# Patient Record
Sex: FEMALE | Race: BLACK OR AFRICAN AMERICAN | NOT HISPANIC OR LATINO | Employment: UNEMPLOYED | URBAN - METROPOLITAN AREA
[De-identification: names, ages, dates, MRNs, and addresses within clinical notes are randomized per-mention and may not be internally consistent; named-entity substitution may affect disease eponyms.]

---

## 2017-01-16 ENCOUNTER — HOSPITAL ENCOUNTER (EMERGENCY)
Facility: HOSPITAL | Age: 47
Discharge: HOME | End: 2017-01-16
Payer: MEDICAID

## 2017-01-16 VITALS
SYSTOLIC BLOOD PRESSURE: 186 MMHG | HEART RATE: 97 BPM | BODY MASS INDEX: 38.57 KG/M2 | WEIGHT: 240 LBS | DIASTOLIC BLOOD PRESSURE: 107 MMHG | TEMPERATURE: 98 F | HEIGHT: 66 IN | RESPIRATION RATE: 18 BRPM | OXYGEN SATURATION: 97 %

## 2017-01-16 DIAGNOSIS — S83.92XA SPRAIN OF LEFT KNEE, UNSPECIFIED LIGAMENT, INITIAL ENCOUNTER: Primary | ICD-10-CM

## 2017-01-16 PROCEDURE — 99283 EMERGENCY DEPT VISIT LOW MDM: CPT

## 2017-01-16 RX ORDER — DICLOFENAC SODIUM 75 MG/1
75 TABLET, DELAYED RELEASE ORAL 2 TIMES DAILY
Qty: 20 TABLET | Refills: 0 | Status: SHIPPED | OUTPATIENT
Start: 2017-01-16 | End: 2017-03-13 | Stop reason: CLARIF

## 2017-01-16 RX ORDER — IBUPROFEN 200 MG
400 TABLET ORAL EVERY 6 HOURS PRN
Qty: 30 TABLET | Refills: 0
Start: 2017-01-16 | End: 2017-03-13 | Stop reason: CLARIF

## 2017-01-16 RX ORDER — PREDNISONE 20 MG/1
40 TABLET ORAL DAILY
Qty: 10 TABLET | Refills: 0 | Status: SHIPPED | OUTPATIENT
Start: 2017-01-16 | End: 2017-01-21

## 2017-01-16 RX ORDER — ACETAMINOPHEN 500 MG
1000 TABLET ORAL 3 TIMES DAILY PRN
Qty: 30 TABLET | Refills: 0
Start: 2017-01-16 | End: 2017-03-13 | Stop reason: CLARIF

## 2017-01-16 NOTE — ED AVS SNAPSHOT
[facility] -   [address] [address]               Yas Quinones   2017  2:22 PM   ED    Description:  Female : 1970   Department:  [facility]           Your Care was Coordinated By:     Provider Role From To    DM Gonzales Physician Assistant 17 [phone] --      Reason for Visit     Knee Pain           Diagnoses this Visit        Comments    Sprain of left knee, unspecified ligament, initial encounter    -  Primary       ED Disposition     ED Disposition Condition Comment    Discharge             To Do List           Follow-up Information     Follow up with Provider Daijaystem In 1 week(s).       These Medications        Disp Refills Start End    ibuprofen (ADVIL,MOTRIN) 200 MG tablet 30 tablet 0 2017     Take 2 tablets (400 mg total) by mouth every 6 (six) hours as needed for Pain. - Oral    Pharmacy: [facility] [address] [place] [address] AT [facility] Ph #: [phone]       acetaminophen (TYLENOL) 500 MG tablet 30 tablet 0 2017     Take 2 tablets (1,000 mg total) by mouth 3 (three) times daily as needed for Pain. - Oral    Pharmacy: [facility] [address] AT [facility] Ph #: [phone]       diclofenac (VOLTAREN) 75 MG EC tablet 20 tablet 0 2017     Take 1 tablet (75 mg total) by mouth 2 (two) times daily. - Oral    Pharmacy: [facility] [address] [facility] AT [facility] Ph #: [phone]       predniSONE (DELTASONE) 20 MG tablet 10 tablet 0 2017    Take 2 tablets (40 mg total) by mouth once daily. - Oral    Pharmacy: [facility] [address] [facility] AT [facility] Ph #: [phone]         [facility]     [facility] On [facility] Nurse Care Line -   Assistance  Registered nurses in the [facility] provide clinical advisement, health education, appointment booking, and other advisory services.  Call for this free service at [phone].             Medications           Message regarding Medications     Verify the changes and/or additions to your medication regime listed below are the same as discussed with your clinician today.  If any of these changes or additions are incorrect, please notify your healthcare provider.        START taking these NEW medications        Refills    ibuprofen (ADVIL,MOTRIN) 200 MG tablet 0    Sig: Take 2 tablets (400 mg total) by mouth every 6 (six) hours as needed for Pain.    Class: No Print    Route: Oral    acetaminophen (TYLENOL) 500 MG tablet 0    Sig: Take 2 tablets (1,000 mg total) by mouth 3 (three) times daily as needed for Pain.    Class: No Print    Route: Oral    diclofenac (VOLTAREN) 75 MG EC tablet 0    Sig: Take 1 tablet (75 mg total) by mouth 2 (two) times daily.    Class: Print    Route: Oral    predniSONE (DELTASONE) 20 MG tablet 0    Sig: Take 2 tablets (40 mg total) by mouth once daily.    Class: Print    Route: Oral           Verify that the below list of medications is an accurate representation of the medications you are currently taking.  If none reported, the list may be blank. If incorrect, please contact your healthcare provider. Carry this list with you in case of emergency.           Current Medications     acetaminophen (TYLENOL) 500 MG tablet Take 2 tablets (1,000 mg total) by mouth 3 (three) times daily as needed for Pain.    alprazolam (XANAX) 1 MG tablet Take 1 mg by mouth as needed.    diclofenac (VOLTAREN) 75 MG EC tablet Take 1 tablet (75 mg total) by mouth 2 (two) times daily.    ibuprofen (ADVIL,MOTRIN) 200 MG tablet Take 2 tablets (400 mg total) by mouth every 6 (six) hours as needed for Pain.    olmesartan-hydrochlorothiazide (BENICAR HCT) 20-12.5 mg per tablet Take 1 tablet by  "mouth once daily.    predniSONE (DELTASONE) 20 MG tablet Take 2 tablets (40 mg total) by mouth once daily.    ranitidine (ZANTAC) 300 MG tablet Take 1 tablet (300 mg total) by mouth once daily.           Clinical Reference Information           Your Vitals Were     BP Pulse Temp Resp Height Weight    186/107 (BP Location: Right arm, Patient Position: Sitting) 97 98 °F (36.7 °C) (Oral) 18 5' 6" (1.676 m) 108.9 kg (240 lb)    SpO2 BMI             97% 38.74 kg/m2         Allergies as of 1/16/2017     No Known Allergies      Immunizations Administered on Date of Encounter - 1/16/2017     None      ED Micro, Lab, POCT     None      ED Imaging Orders     None        Discharge Instructions           Knee Sprain    A sprain is an injury to the ligaments or capsule that holds a joint together. There are no broken bones. Most sprains take 3 to 6 weeks to heal. If it a severe sprain where the ligament is completely torn, it can take months to recover.  Most knee sprains are treated with a splint, knee immobilizer brace, or elastic wrap for support. Severe sprains may require surgery.  Home care  · Stay off the injured leg as much as possible until you can walk on it without pain. If you have a lot of pain with walking, crutches or a walker may be prescribed. (These can be rented or purchased at many pharmacies and surgical or orthopedic supply stores). Follow your healthcare provider's advice about when to begin putting weight on that leg.  · Keep your leg elevated to reduce pain and swelling. When sleeping, place a pillow under the injured leg. When sitting, support the injured leg so it is level with your waist. This is very important during the first 48 hours.  · Apply an ice pack over the injured area for 15 to 20 minutes every 3 to 6 hours. You should do this for the first 24 to 48 hours. You can make an ice pack by filling a plastic bag that seals at the top with ice cubes and then wrapping it with a thin towel. Continue " to use ice packs for relief of pain and swelling as needed. As the ice melts, be careful to avoid getting your wrap, splint, or cast wet. After 48 hours, apply heat (warm shower or warm bath) for 15 to 20 minutes several times a day, or alternate ice and heat. You can place the ice pack directly over the splint. If you have to wear a hook-and-loop knee brace, you can open it to apply the ice pack, or heat, directly to the knee. Never put ice directly on the skin. Always wrap the ice in a towel or other type of cloth.  · You may use over-the-counter pain medicine to control pain, unless another pain medicine was prescribed.If you have chronic liver or kidney disease or ever had a stomach ulcer or GI bleeding, talk with your healthcare provider before using these medicines.  · If you were given a splint, keep it completely dry at all times. Bathe with your splint out of the water, protected with 2 large plastic bags, rubber-banded at the top end. If a fiberglass splint gets wet, you can dry it with a hair dryer. If you have a hook-and-loop knee brace, you can remove this to bathe, unless told otherwise.  Follow-up care  Follow up with your doctor as advised. Any X-rays you had today dont show any broken bones, breaks, or fractures. Sometimes fractures dont show up on the first X-ray. Bruises and sprains can sometimes hurt as much as a fracture. These injuries can take time to heal completely. If your symptoms dont improve or they get worse, talk with your doctor. You may need a repeat X-ray. If X-rays were taken, you will be told of any new findings that may affect your care.  Call 911  Call 911 if you have:  ·  Shortness of breath  ·  Chest pain  When to seek medical advice  Call your healthcare provider right away if any of these occur:  · The splint or knee immobilizer brace becomes wet or soft  · The fiberglass cast or splint remains wet for more than 24 hours  · Pain or swelling increases  · The injured  leg or toes become cold, blue, numb, or tingly  © 8569-9568 [employer]. [address] [place] [address]. All rights reserved. This information is not intended as a substitute for professional medical care. Always follow your healthcare professional's instructions.          How Your Knee Works  A healthy knee bends easily and rotates slightly. The joint absorbs stress and moves smoothly. This allows you to walk, squat, and turn without pain.    A healthy knee  The knee is a hinge joint, formed where the thighbone (femur) and the shinbone (tibia) meet. It is the largest joint in the body. The joint is covered with smooth tissue and powered by large muscles. When all the parts listed below are healthy, a knee should move easily:  · Cartilage is a layer of smooth tissue. It covers the ends of the thighbone and shinbone. It also lines the back side of the kneecap. Healthy cartilage absorbs stress and allows the knee to bend easily.  · Muscles power the knee and leg for movement.  · Tendons attach the muscles to the bones.  · Ligaments are bands of tissue that connect bones and brace the joint.  · Bones that make up your knee joint include your thighbone (femur), shinbone (tibia), and kneecap (patella).  · Menisci are 2 wedge shaped pieces of cartilage that absorb shock between the thighbone and shinbone.  © 7997-5453 [employer]. [address] [place] [address]. All rights reserved. This information is not intended as a substitute for professional medical care. Always follow your healthcare professional's instructions.          Knee Immobilizer  A knee immobilizer is a type of brace used to provide support and limit movement of the knee. This will make you more comfortable as your injury heals.  Home care  · The knee brace should be worn whenever you are out of bed, unless told otherwise. You may wear it in bed while asleep for the first few nights or until the pain starts  to go away. Otherwise, remove the brace at night to avoid muscle stiffness from lack of joint movement.  · You can open the hook-and-loop brace to dress, bathe, and apply ice or heat packs as directed.  Call 911  Call 911 if you have:  · Shortness of breath  · Chest pain  When to seek medical advice  Call your healthcare provider right away if any of these occur:  · Worsening pain in the knee  · Weakness, numbness, or tingling in the foot  · Increased swelling, redness or warmth of the knee joint  © 1430-6865 [employer]. [address] [place] [address]. All rights reserved. This information is not intended as a substitute for professional medical care. Always follow your healthcare professional's instructions.           [facility] complies with applicable Federal civil rights laws and does not discriminate on the basis of race, color, national origin, age, disability, or sex.        Language Assistance Services     ATTENTION: Language assistance services are available, free of charge. Please call [phone].      ATENCIÓN: Si habla esha, tiene a leonardo disposición servicios gratuitos de asistencia lingüística. Llame al [phone].     ALINA Ý: N?u b?n nói Ti?ng Vi?t, có các d?ch v? h? tr? ngôn ng? mi?n phí dành cho b?n. G?i s? [phone].

## 2017-01-16 NOTE — ED PROVIDER NOTES
"SCRIBE #1 NOTE: I, Mariel Guerra, am scribing for, and in the presence of, DM Monson. I have scribed the entire note.      History      Chief Complaint   Patient presents with    Knee Pain     L knee pain x 3 days       Review of patient's allergies indicates:  No Known Allergies     HPI   HPI    1/16/2017, 2:25 PM   History obtained from the patient      History of Present Illness: Yas Quinones is a 46 y.o. female patient who presents to the Emergency Department for left knee pain which onset gradually 3 days ago. Symptoms are constant and moderate in severity. Pain radiates from left knee to lower back. Pt was cleaning around her home when she "twisted" her knee. No mitigating or exacerbating factors reported. No ssociated sxs. Patient denies any fever, n/v/d, leg swelling, redness, numbness, weakness, HA, abdominal pain, dysuria, hematuria, CP, SOB, and all other sxs at this time. No further complaints or concerns at this time.         Arrival mode: Personal vehicle    PCP: Provider Not[facility]       Past Medical History:  Past Medical History   Diagnosis Date    Anxiety     Hypertension        Past Surgical History:  Past Surgical History   Procedure Laterality Date    Tubal ligation      Colonoscopy N/A 12/7/2015     Procedure: COLONOSCOPY;  Surgeon: Radha Covington MD;  Location: [facility];  Service: Endoscopy;  Laterality: N/A;         Family History:  Family History   Problem Relation Age of Onset    Colon cancer Sister 48       Social History:  Social History     Social History Main Topics    Smoking status: Never Smoker    Smokeless tobacco: Never Used    Alcohol use No    Drug use: No    Sexual activity: Not Currently       ROS   Review of Systems   Constitutional: Negative for fever.   HENT: Negative for sore throat.    Respiratory: Negative for shortness of breath.    Cardiovascular: Negative for chest pain and leg swelling.   Gastrointestinal: Negative for abdominal pain, " "constipation, diarrhea, nausea and vomiting.   Genitourinary: Negative for dysuria, flank pain and hematuria.   Musculoskeletal: Negative for back pain and joint swelling.        (+) Left knee pain   Skin: Negative for rash.   Neurological: Negative for dizziness, weakness, numbness and headaches.   Hematological: Does not bruise/bleed easily.       Physical Exam    Initial Vitals   BP Pulse Resp Temp SpO2   01/16/17 1359 01/16/17 1359 01/16/17 1359 01/16/17 1359 01/16/17 1359   186/107 97 18 98 °F (36.7 °C) 97 %      Physical Exam  Nursing Notes and Vital Signs Reviewed.  Constitutional: Patient is in no acute distress. Awake and alert. Well-developed and well-nourished.  Head: Atraumatic. Normocephalic.  Eyes: PERRL. EOM intact. Conjunctivae are not pale. No scleral icterus.  ENT: Mucous membranes are moist. Oropharynx is clear and symmetric.    Neck: Supple. Full ROM. No lymphadenopathy.  Cardiovascular: Regular rate. Regular rhythm. No murmurs, rubs, or gallops. Distal pulses are 2+ and symmetric.  Pulmonary/Chest: No respiratory distress. Clear to auscultation bilaterally. No wheezing, rales, or rhonchi.  Musculoskeletal: Moves all extremities. No obvious deformities. No edema. No calf tenderness. Tenderness to the left anterior knee. Minimal swelling. No swelling. No deformity. FROM of left knee. No bony tenderness. No joint laxity.   Skin: Warm and dry.  Neurological:  Alert, awake, and appropriate.  Normal speech.  No acute focal neurological deficits are appreciated.  Psychiatric: Normal affect. Good eye contact. Appropriate in content.    ED Course    Procedures  ED Vital Signs:  Vitals:    01/16/17 1359   BP: (!) 186/107   Pulse: 97   Resp: 18   Temp: 98 °F (36.7 °C)   TempSrc: Oral   SpO2: 97%   Weight: 108.9 kg (240 lb)   Height: 5' 6" (1.676 m)            The Emergency Provider reviewed the vital signs and test results, which are outlined above.    ED Discussion     2:29 PM: Reassessed pt at this time. " Discussed with pt all pertinent ED information and plan of tx. Gave pt all f/u and return to the ED instructions. All questions and concerns were addressed at this time. Pt expresses understanding of information and instructions, and is comfortable with plan to discharge. Pt is stable for discharge.    Pre-hypertension/Hypertension: The pt has been informed that they may have pre-hypertension or hypertension based on a blood pressure reading in the ED. I recommend that the pt call the PCP listed on their discharge instructions or a physician of their choice this week to arrange f/u for further evaluation of possible pre-hypertension or hypertension.       ED Medication(s):  Medications - No data to display    Discharge Medication List as of 1/16/2017  2:27 PM      START taking these medications    Details   acetaminophen (TYLENOL) 500 MG tablet Take 2 tablets (1,000 mg total) by mouth 3 (three) times daily as needed for Pain., Starting 1/16/2017, Until Discontinued, No Print      diclofenac (VOLTAREN) 75 MG EC tablet Take 1 tablet (75 mg total) by mouth 2 (two) times daily., Starting 1/16/2017, Until Discontinued, Print      ibuprofen (ADVIL,MOTRIN) 200 MG tablet Take 2 tablets (400 mg total) by mouth every 6 (six) hours as needed for Pain., Starting 1/16/2017, Until Discontinued, No Print      predniSONE (DELTASONE) 20 MG tablet Take 2 tablets (40 mg total) by mouth once daily., Starting 1/16/2017, Until Sat 1/21/17, Print             Follow-up Information     Follow up with Provider Notinsystem In 1 week(s).            Medical Decision Making              Scribe Attestation:   Scribe #1: I performed the above scribed service and the documentation accurately describes the services I performed. I attest to the accuracy of the note.    Attending:   Physician Attestation Statement for Scribe #1: I, DM Monson, personally performed the services described in this documentation, as scribed by Mariel Guerra, in  my presence, and it is both accurate and complete.          Clinical Impression       ICD-10-CM ICD-9-CM   1. Sprain of left knee, unspecified ligament, initial encounter S83.92XA 844.9       Disposition:   Disposition: Discharged  Condition: Stable       DM Gonzales  01/17/17 1110

## 2017-01-16 NOTE — DISCHARGE INSTRUCTIONS
Knee Sprain    A sprain is an injury to the ligaments or capsule that holds a joint together. There are no broken bones. Most sprains take 3 to 6 weeks to heal. If it a severe sprain where the ligament is completely torn, it can take months to recover.  Most knee sprains are treated with a splint, knee immobilizer brace, or elastic wrap for support. Severe sprains may require surgery.  Home care  · Stay off the injured leg as much as possible until you can walk on it without pain. If you have a lot of pain with walking, crutches or a walker may be prescribed. (These can be rented or purchased at many pharmacies and surgical or orthopedic supply stores). Follow your healthcare provider's advice about when to begin putting weight on that leg.  · Keep your leg elevated to reduce pain and swelling. When sleeping, place a pillow under the injured leg. When sitting, support the injured leg so it is level with your waist. This is very important during the first 48 hours.  · Apply an ice pack over the injured area for 15 to 20 minutes every 3 to 6 hours. You should do this for the first 24 to 48 hours. You can make an ice pack by filling a plastic bag that seals at the top with ice cubes and then wrapping it with a thin towel. Continue to use ice packs for relief of pain and swelling as needed. As the ice melts, be careful to avoid getting your wrap, splint, or cast wet. After 48 hours, apply heat (warm shower or warm bath) for 15 to 20 minutes several times a day, or alternate ice and heat. You can place the ice pack directly over the splint. If you have to wear a hook-and-loop knee brace, you can open it to apply the ice pack, or heat, directly to the knee. Never put ice directly on the skin. Always wrap the ice in a towel or other type of cloth.  · You may use over-the-counter pain medicine to control pain, unless another pain medicine was prescribed.If you have chronic liver or kidney disease or ever had a stomach  ulcer or GI bleeding, talk with your healthcare provider before using these medicines.  · If you were given a splint, keep it completely dry at all times. Bathe with your splint out of the water, protected with 2 large plastic bags, rubber-banded at the top end. If a fiberglass splint gets wet, you can dry it with a hair dryer. If you have a hook-and-loop knee brace, you can remove this to bathe, unless told otherwise.  Follow-up care  Follow up with your doctor as advised. Any X-rays you had today dont show any broken bones, breaks, or fractures. Sometimes fractures dont show up on the first X-ray. Bruises and sprains can sometimes hurt as much as a fracture. These injuries can take time to heal completely. If your symptoms dont improve or they get worse, talk with your doctor. You may need a repeat X-ray. If X-rays were taken, you will be told of any new findings that may affect your care.  Call 911  Call 911 if you have:  ·  Shortness of breath  ·  Chest pain  When to seek medical advice  Call your healthcare provider right away if any of these occur:  · The splint or knee immobilizer brace becomes wet or soft  · The fiberglass cast or splint remains wet for more than 24 hours  · Pain or swelling increases  · The injured leg or toes become cold, blue, numb, or tingly  © 0289-2968 [employer]. [address] [place] [address]. All rights reserved. This information is not intended as a substitute for professional medical care. Always follow your healthcare professional's instructions.          How Your Knee Works  A healthy knee bends easily and rotates slightly. The joint absorbs stress and moves smoothly. This allows you to walk, squat, and turn without pain.    A healthy knee  The knee is a hinge joint, formed where the thighbone (femur) and the shinbone (tibia) meet. It is the largest joint in the body. The joint is covered with smooth tissue and powered by large muscles. When all the  parts listed below are healthy, a knee should move easily:  · Cartilage is a layer of smooth tissue. It covers the ends of the thighbone and shinbone. It also lines the back side of the kneecap. Healthy cartilage absorbs stress and allows the knee to bend easily.  · Muscles power the knee and leg for movement.  · Tendons attach the muscles to the bones.  · Ligaments are bands of tissue that connect bones and brace the joint.  · Bones that make up your knee joint include your thighbone (femur), shinbone (tibia), and kneecap (patella).  · Menisci are 2 wedge shaped pieces of cartilage that absorb shock between the thighbone and shinbone.  © 3500-7959 [employer]. [address] [place] [address]. All rights reserved. This information is not intended as a substitute for professional medical care. Always follow your healthcare professional's instructions.          Knee Immobilizer  A knee immobilizer is a type of brace used to provide support and limit movement of the knee. This will make you more comfortable as your injury heals.  Home care  · The knee brace should be worn whenever you are out of bed, unless told otherwise. You may wear it in bed while asleep for the first few nights or until the pain starts to go away. Otherwise, remove the brace at night to avoid muscle stiffness from lack of joint movement.  · You can open the hook-and-loop brace to dress, bathe, and apply ice or heat packs as directed.  Call 911  Call 911 if you have:  · Shortness of breath  · Chest pain  When to seek medical advice  Call your healthcare provider right away if any of these occur:  · Worsening pain in the knee  · Weakness, numbness, or tingling in the foot  · Increased swelling, redness or warmth of the knee joint  © 3839-6185 [employer]. [address] [place] [address]. All rights reserved. This information is not intended as a substitute for professional medical care. Always follow  your healthcare professional's instructions.

## 2017-03-13 ENCOUNTER — HOSPITAL ENCOUNTER (EMERGENCY)
Facility: HOSPITAL | Age: 47
Discharge: HOME | End: 2017-03-13
Payer: MEDICAID

## 2017-03-13 VITALS
RESPIRATION RATE: 18 BRPM | HEART RATE: 80 BPM | OXYGEN SATURATION: 98 % | DIASTOLIC BLOOD PRESSURE: 101 MMHG | TEMPERATURE: 98 F | BODY MASS INDEX: 33.75 KG/M2 | SYSTOLIC BLOOD PRESSURE: 199 MMHG | WEIGHT: 210 LBS | HEIGHT: 66 IN

## 2017-03-13 DIAGNOSIS — M70.72 BURSITIS, HIP, LEFT: Primary | ICD-10-CM

## 2017-03-13 DIAGNOSIS — I10 ESSENTIAL HYPERTENSION: ICD-10-CM

## 2017-03-13 PROCEDURE — 25000003 PHARM REV CODE 250: Performed by: PHYSICIAN ASSISTANT

## 2017-03-13 PROCEDURE — 99283 EMERGENCY DEPT VISIT LOW MDM: CPT

## 2017-03-13 RX ORDER — CLONIDINE HYDROCHLORIDE 0.1 MG/1
0.1 TABLET ORAL
Status: COMPLETED | OUTPATIENT
Start: 2017-03-13 | End: 2017-03-13

## 2017-03-13 RX ORDER — QUETIAPINE FUMARATE 100 MG/1
TABLET, FILM COATED ORAL
COMMUNITY

## 2017-03-13 RX ORDER — HYDROCODONE BITARTRATE AND ACETAMINOPHEN 10; 325 MG/1; MG/1
1 TABLET ORAL
Status: COMPLETED | OUTPATIENT
Start: 2017-03-13 | End: 2017-03-13

## 2017-03-13 RX ORDER — SERTRALINE HYDROCHLORIDE 50 MG/1
50 TABLET, FILM COATED ORAL DAILY
COMMUNITY

## 2017-03-13 RX ORDER — NABUMETONE 500 MG/1
500 TABLET, FILM COATED ORAL 2 TIMES DAILY PRN
Qty: 12 TABLET | Refills: 0 | Status: SHIPPED | OUTPATIENT
Start: 2017-03-13

## 2017-03-13 RX ADMIN — HYDROCODONE BITARTRATE AND ACETAMINOPHEN 1 TABLET: 10; 325 TABLET ORAL at 11:03

## 2017-03-13 RX ADMIN — CLONIDINE HYDROCHLORIDE 0.1 MG: 0.1 TABLET ORAL at 11:03

## 2017-03-13 NOTE — ED PROVIDER NOTES
Encounter Date: 3/13/2017       History     Chief Complaint   Patient presents with    Hip Pain     c/o left hip pain since last night. denies injury     Review of patient's allergies indicates:  No Known Allergies  HPI Comments: The patient presents to the ER to be evaluated for atraumatic pain of her left hip. She states that she first noticed the pain yesterday while doing housework and cleaning her kitchen. She denies any known injury or trauma. She denies any previous problems with her hip. She describes the pain as an aching soreness to the hip joint. She states that the onset was gradual. She states that the course is constant. She states that the degree is moderate. She states that the pain is worse with walking and weight bearing. She states that certain positions exacerbate and mitigate the pain. She denies any radiation of the pain. She denies any numbness, or weakness to the leg. She denies any additional symptoms or concerns. She states that she did not take anything for the pain.          Past Medical History:   Diagnosis Date    Anxiety     Depression     Hypertension      Past Surgical History:   Procedure Laterality Date    COLONOSCOPY N/A 12/7/2015    Procedure: COLONOSCOPY;  Surgeon: Radha Covington MD;  Location: [facility];  Service: Endoscopy;  Laterality: N/A;    TUBAL LIGATION       Family History   Problem Relation Age of Onset    Colon cancer Sister 48     Social History   Substance Use Topics    Smoking status: Never Smoker    Smokeless tobacco: Never Used    Alcohol use No     Review of Systems   Constitutional: Negative for activity change, chills, diaphoresis and fever.   HENT: Negative for sore throat.    Eyes: Negative for pain and visual disturbance.   Respiratory: Negative for cough, chest tightness and shortness of breath.    Cardiovascular: Negative for chest pain, palpitations and leg swelling.   Gastrointestinal: Negative for abdominal pain, blood in stool, diarrhea,  nausea and vomiting.   Genitourinary: Negative for decreased urine volume, dysuria, flank pain, frequency, menstrual problem, pelvic pain and urgency.   Musculoskeletal: Positive for arthralgias and gait problem. Negative for back pain, joint swelling and neck pain.   Skin: Negative for color change, rash and wound.   Allergic/Immunologic: Negative for immunocompromised state.   Neurological: Negative for dizziness, seizures, syncope, speech difficulty, weakness, light-headedness, numbness and headaches.   Psychiatric/Behavioral: Negative for confusion.       Physical Exam   Initial Vitals   BP Pulse Resp Temp SpO2   03/13/17 1100 03/13/17 1100 03/13/17 1100 03/13/17 1100 03/13/17 1100   208/109 87 18 98.3 °F (36.8 °C) 96 %     Physical Exam    Nursing note and vitals reviewed.  Constitutional: She appears well-developed and well-nourished.   HENT:   Mouth/Throat: Oropharynx is clear and moist.   Eyes: Conjunctivae are normal.   Cardiovascular: Normal rate, regular rhythm and intact distal pulses.   Pulmonary/Chest: Breath sounds normal. No respiratory distress.   Abdominal: Soft. There is no tenderness. There is no guarding.   Musculoskeletal: Normal range of motion. She exhibits no edema.        Legs:  Pain during passive ROM and to palpation of left hip. Full ROM. No deformity. No swelling, erythema, ecchymosis, or increased warmth. No edema to lower leg. No pain to palpation over the IT band. No spine tenderness. No bony pelvis tenderness. No calf tenderness. She ambulates with a slight limp due to the pain.    Neurological: She is alert and oriented to person, place, and time. She has normal strength. No cranial nerve deficit or sensory deficit.   Skin: Skin is warm. No rash noted. No erythema.   Psychiatric: She has a normal mood and affect. Her behavior is normal.         ED Course   Procedures  Labs Reviewed - No data to display          Medical Decision Making:   History:   Old Medical Records: I decided to  obtain old medical records.  Initial Assessment:   Acute atraumatic left hip pain since yesterday. No pre-arrival treatment.     Elevated blood pressure. She states that her PCP recently switched her from Benicar HCT to Sandra due to uncontrolled HTN.   Differential Diagnosis:   Fracture, AVN, Tendonitis, Bursitis, Arthritis, Gout, Sciatica, Radiculopathy, DVT, Muscle strain, Dislocation, Bone cancer, etc    Clinical Tests:   Radiological Study: Ordered and Reviewed  ED Management:  Negative x ray     Analgesic given in ED and prescribed. Patient advised to rest joint and follow up with her PCP for re-check.     BP significantly elevated. She states that her PCP recently started her on a new BP medication. She states that she took it this morning. I advised her that her blood pressure was markedly elevated and that she would need close follow up with her PCP for further evaluation and management of uncontrolled HTN. She verbalizes understanding.     Additional MDM:   X-Rays: I have independently interpreted X-Ray(s) - see notes.     Imaging Results         X-Ray Hip 2 or 3 views Left (Final result) Result time:  03/13/17 11:33:53    Final result by Olvin Appiah MD (03/13/17 11:33:53)    Impression:     Negative left hip      Electronically signed by: OLVIN APPIAH MD  Date:     03/13/17  Time:    11:33     Narrative:    History: Left hip pain    No bony or joint abnormality the left hip is seen. The sacroiliac and hip joints are intact bilaterally.                          ED Course     Clinical Impression:   The primary encounter diagnosis was Bursitis, hip, left. A diagnosis of Essential hypertension was also pertinent to this visit.    Disposition:   Disposition: Discharged  Condition: Stable       Yuniel Martinez PA-C  03/13/17 [id number]

## 2017-03-13 NOTE — ED AVS SNAPSHOT
[facility] -   [id number] [facility] [id number]               Yas Quinones   3/13/2017 11:12 AM   ED    Description:  Female : 1970   Department:  [facility] -            Your Care was Coordinated By:     Provider Role From To    Yuniel Martinez PA-C Physician Assistant 17 1108 --      Reason for Visit     Hip Pain           Diagnoses this Visit        Comments    Bursitis, hip, left    -  Primary     Essential hypertension           ED Disposition     None           To Do List           Follow-up Information     Follow up with [facility].    Specialty:  Emergency Medicine    Why:  If symptoms worsen in any way. Follow up with your primary care physician, Dr Slaughter, within the next 1-2 days for re-evaluation and further management.     Contact information:    [address] [place] [address] [id number] [phone]       These Medications        Disp Refills Start End    nabumetone (RELAFEN) 500 MG tablet 12 tablet 0 3/13/2017     Take 1 tablet (500 mg total) by mouth 2 (two) times daily as needed for Pain. - Oral      [facility]     [facility] -  Assistance  Registered nurses in the [facility] provide clinical advisement, health education, appointment booking, and other advisory services.  Call for this free service at [phone].             Medications           Message regarding Medications     Verify the changes and/or additions to your medication regime listed below are the same as discussed with your clinician today.  If any of these changes or additions are incorrect, please notify your healthcare provider.        START taking these NEW medications        Refills    nabumetone (RELAFEN) 500 MG tablet 0    Sig: Take 1 tablet (500 mg total) by mouth 2 (two) times daily as needed for Pain.    Class: Print    Route: Oral      These  "medications were administered today        Dose Freq    hydrocodone-acetaminophen 10-325mg per tablet 1 tablet 1 tablet ED 1 Time    Sig: Take 1 tablet by mouth ED 1 Time.    Class: Normal    Route: Oral    Cosign for Ordering: Required by Jose Martin Lakhani MD    cloNIDine tablet 0.1 mg 0.1 mg ED 1 Time    Sig: Take 1 tablet (0.1 mg total) by mouth ED 1 Time.    Class: Normal    Route: Oral    Cosign for Ordering: Required by Jose Martin Lakhani MD      STOP taking these medications     olmesartan-hydrochlorothiazide (BENICAR HCT) 20-12.5 mg per tablet Take 1 tablet by mouth once daily.    acetaminophen (TYLENOL) 500 MG tablet Take 2 tablets (1,000 mg total) by mouth 3 (three) times daily as needed for Pain.    diclofenac (VOLTAREN) 75 MG EC tablet Take 1 tablet (75 mg total) by mouth 2 (two) times daily.    ibuprofen (ADVIL,MOTRIN) 200 MG tablet Take 2 tablets (400 mg total) by mouth every 6 (six) hours as needed for Pain.           Verify that the below list of medications is an accurate representation of the medications you are currently taking.  If none reported, the list may be blank. If incorrect, please contact your healthcare provider. Carry this list with you in case of emergency.           Current Medications     AMLODIPINE BES/OLMESARTAN MED (DALILA ORAL) Take by mouth.    quetiapine (SEROQUEL) 100 MG Tab Take by mouth.    sertraline (ZOLOFT) 50 MG tablet Take 50 mg by mouth once daily.    alprazolam (XANAX) 1 MG tablet Take 1 mg by mouth as needed.    nabumetone (RELAFEN) 500 MG tablet Take 1 tablet (500 mg total) by mouth 2 (two) times daily as needed for Pain.    ranitidine (ZANTAC) 300 MG tablet Take 1 tablet (300 mg total) by mouth once daily.           Clinical Reference Information           Your Vitals Were     BP Pulse Temp Resp Height Weight    208/109 (BP Location: Right arm, Patient Position: Sitting) 87 98.3 °F (36.8 °C) (Oral) 18 5' 6" (1.676 m) 95.3 kg (210 lb)    Last Period SpO2 " BMI          02/25/2017 (Exact Date) 96% 33.89 kg/m2        Allergies as of 3/13/2017     No Known Allergies      Immunizations Administered on Date of Encounter - 3/13/2017     None      ED Micro, Lab, POCT     None      ED Imaging Orders     Start Ordered       Status Ordering Provider    03/13/17 1121 03/13/17 1121  X-Ray Hip 2 or 3 views Left  1 time imaging      Final result         Discharge Instructions         Out of Control High Blood Pressure (Established)    Your blood pressure was unusually high today. This can occur if youve missed doses of your blood pressure medicine. Or it can happen if you are taking other medicines. These include some asthma inhalers, decongestants, diet pills, and street drugs like cocaine and amphetamine.  Other causes include:  · Weight gain  · More salt in your diet  · Smoking  · Caffeine  Your blood pressure can also rise if you are emotionally upset or in intense pain. It may go back to normal after a period of rest.  A blood pressure reading is made up of 2 numbers. There is a top number over a bottom number. The top number is the systolic pressure. The bottom number is the diastolic pressure. A normal blood pressure is less than 120 over less than 80. High blood pressure (hypertension) is when the top number is 140 or higher. Or it is when the bottom number is 90 or higher. To be high blood pressure, the numbers must be higher when tested over a period of time. The blood pressures between normal and hypertension are called prehypertension.  Home care  Its important to take steps to lower your blood pressure. If you are taking blood pressure medicine, the guidelines below may help you need less or no medicines in the future.  · Begin a weight-loss program if you are overweight.  · Cut back on the amount of salt in your diet:  ¨ Avoid high-salt foods like olives, pickles, smoked meats, and salted potato chips.  ¨ Dont add salt to your food at the table.  ¨ Use only small  amounts of salt when cooking.  · Begin an exercise program. Talk with your health care provider about what exercise program is best for you. It doesnt have to be difficult. Even brisk walking for 20 minutes 3 times a week is a good form of exercise.  · Avoid medicines that have heart stimulants in them. This includes many cold and sinus decongestant pills and sprays, as well as diet pills. Check the warnings about hypertension on the label. Stimulants such as amphetamine or cocaine could be lethal for someone with hypertension. Never take these.  · Limit how much caffeine you drink. Or switch to noncaffeinated beverages.  · Stop smoking. If you are a long-time smoker, this can be hard. Enroll in a stop-smoking program to make it more likely that you will succeed. Talk with your provider about ways to quit.  · Learn how to handle stress better. This is an important part of any program to lower blood pressure. Learn ways to relax. These include meditation, yoga, and biofeedback.  · If medicines were prescribed, take them exactly as directed. Missing doses may cause your blood pressure to get out of control.  · Consider buying an automatic blood pressure machine. These are available at many [employer]. Use this to monitor your blood pressure. Report the results to your provider.  Follow-up care  Regular visits to your own health care provider for blood pressure and medicine checks are an important part of your care. Make a follow-up appointment as directed.  When to seek medical advice  Call your health care provider right away if any of these occur:  · Chest, arm, shoulder, neck, or upper back pain  · Shortness of breath  · Severe headache  · Throbbing or rushing sound in the ears  · Nosebleed  · Extreme drowsiness, confusion, or fainting  · Dizziness or dizziness with spinning sensation (vertigo)  · Weakness in an arm or leg or on one side of the face  · Difficulty speaking or seeing   Date Last Reviewed:  11/25/2014 © 2000-2016 [employer]. [address] [place] [address], [place] [address]. All rights reserved. This information is not intended as a substitute for professional medical care. Always follow your healthcare professional's instructions.          Controlling High Blood Pressure  High blood pressure (hypertension) is often called the silent killer. This is because many people who have it dont know it. High blood pressure is defined as 140/90 mm Hg or higher. Know your blood pressure and remember to check it regularly. Doing so can save your life. Here are some things you can do to help control your blood pressure.    Choose heart-healthy foods  · Select low-salt, low-fat foods. Limit sodium intake to 2,400 mg per day or the amount suggested by your healthcare provider.  · Limit canned, dried, cured, packaged, and fast foods. These can contain a lot of salt.  · Eat 8 to 10 servings of fruits and vegetables every day.  · Choose lean meats, fish, or chicken.  · Eat whole-grain pasta, brown rice, and beans.  · Eat 2 to 3 servings of low-fat or fat-free dairy products.  · Ask your doctor about the DASH eating plan. This plan helps reduce blood pressure.  · When you go to a restaurant, ask that your meal be prepared with no added salt.  Maintain a healthy weight  · Ask your healthcare provider how many calories to eat a day. Then stick to that number.  · Ask your healthcare provider what weight range is healthiest for you. If you are overweight, a weight loss of only 3% to 5% of your body weight can help lower blood pressure. Generally, a good weight loss goal is to lose 10% of your body weight in a year.  · Limit snacks and sweets.  · Get regular exercise.  Get up and get active  · Choose activities you enjoy. Find ones you can do with friends or family. This includes bicycling, dancing, walking, and jogging.  · Park farther away from building entrances.  · Use stairs instead of the elevator.  · When you  can, walk or bike instead of driving.  · [place] leaves, garden, or do household repairs.  · Be active at a moderate to vigorous level of physical activity for at least 40 minutes for a minimum of 3 to 4 days a week.   Manage stress  · Make time to relax and enjoy life. Find time to laugh.  · Communicate your concerns with your loved ones and your healthcare provider.  · Visit with family and friends, and keep up with hobbies.  Limit alcohol and quit smoking  · Men should have no more than 2 drinks per day.  · Women should have no more than 1 drink per day.  · Talk with your healthcare provider about quitting smoking. Smoking significantly increases your risk for heart disease and stroke. Ask your healthcare provider about community smoking cessation programs and other options.  Medicines  If lifestyle changes arent enough, your healthcare provider may prescribe high blood pressure medicine. Take all medicines as prescribed. If you have any questions about your medicines, ask your healthcare provider before stopping or changing them.   Date Last Reviewed: 4/27/2016 © 2000-2016 [employer]. [address] [place] [address]. All rights reserved. This information is not intended as a substitute for professional medical care. Always follow your healthcare professional's instructions.          How Your Hip Works  The hip joint is one of the bodys largest weight-bearing joints. Its a ball-and-socket joint. This helps the hip remain stable even during twisting and extreme ranges of motion. A healthy hip joint allows you to walk, squat, and turn without pain.     Side view of the right hip   A healthy hip  The hip joint is formed where the rounded head of the thighbone (femur) joins the pelvic bone. The joint is covered with tissue and powered by large muscles. When all of the parts listed below are healthy, a hip should move easily.     Front view of the right hip   · Cartilage is a layer of smooth  tissue. It covers the ball of the thighbone, and lines the socket of the pelvic bone. Healthy cartilage absorbs stress and allows the ball to glide easily in the socket.  · Muscles power the hip and leg for movement.  · Tendons attach the muscles to the bones.  Date Last Reviewed: 8/28/2015  © 4470-8754 [employer]. [address] [place] [address], [place] [address]. All rights reserved. This information is not intended as a substitute for professional medical care. Always follow your healthcare professional's instructions.          Discharge References/Attachments     BURSITIS, WHAT IS (ENGLISH)       [facility]  complies with applicable Federal civil rights laws and does not discriminate on the basis of race, color, national origin, age, disability, or sex.        Language Assistance Services     ATTENTION: Language assistance services are available, free of charge. Please call [phone].      ATENCIÓN: Si habla español, tiene a leonardo disposición servicios gratuitos de asistencia lingüística. Llame al [phone].     ALINA Ý: N?u b?n nói Ti?ng Vi?t, có các d?ch v? h? tr? ngôn ng? mi?n phí dành cho b?n. G?i s? [phone].

## 2017-03-13 NOTE — DISCHARGE INSTRUCTIONS
Out of Control High Blood Pressure (Established)    Your blood pressure was unusually high today. This can occur if youve missed doses of your blood pressure medicine. Or it can happen if you are taking other medicines. These include some asthma inhalers, decongestants, diet pills, and street drugs like cocaine and amphetamine.  Other causes include:  · Weight gain  · More salt in your diet  · Smoking  · Caffeine  Your blood pressure can also rise if you are emotionally upset or in intense pain. It may go back to normal after a period of rest.  A blood pressure reading is made up of 2 numbers. There is a top number over a bottom number. The top number is the systolic pressure. The bottom number is the diastolic pressure. A normal blood pressure is less than 120 over less than 80. High blood pressure (hypertension) is when the top number is 140 or higher. Or it is when the bottom number is 90 or higher. To be high blood pressure, the numbers must be higher when tested over a period of time. The blood pressures between normal and hypertension are called prehypertension.  Home care  Its important to take steps to lower your blood pressure. If you are taking blood pressure medicine, the guidelines below may help you need less or no medicines in the future.  · Begin a weight-loss program if you are overweight.  · Cut back on the amount of salt in your diet:  ¨ Avoid high-salt foods like olives, pickles, smoked meats, and salted potato chips.  ¨ Dont add salt to your food at the table.  ¨ Use only small amounts of salt when cooking.  · Begin an exercise program. Talk with your health care provider about what exercise program is best for you. It doesnt have to be difficult. Even brisk walking for 20 minutes 3 times a week is a good form of exercise.  · Avoid medicines that have heart stimulants in them. This includes many cold and sinus decongestant pills and sprays, as well as diet pills. Check the warnings about  hypertension on the label. Stimulants such as amphetamine or cocaine could be lethal for someone with hypertension. Never take these.  · Limit how much caffeine you drink. Or switch to noncaffeinated beverages.  · Stop smoking. If you are a long-time smoker, this can be hard. Enroll in a stop-smoking program to make it more likely that you will succeed. Talk with your provider about ways to quit.  · Learn how to handle stress better. This is an important part of any program to lower blood pressure. Learn ways to relax. These include meditation, yoga, and biofeedback.  · If medicines were prescribed, take them exactly as directed. Missing doses may cause your blood pressure to get out of control.  · Consider buying an automatic blood pressure machine. These are available at many drugstores. Use this to monitor your blood pressure. Report the results to your provider.  Follow-up care  Regular visits to your own health care provider for blood pressure and medicine checks are an important part of your care. Make a follow-up appointment as directed.  When to seek medical advice  Call your health care provider right away if any of these occur:  · Chest, arm, shoulder, neck, or upper back pain  · Shortness of breath  · Severe headache  · Throbbing or rushing sound in the ears  · Nosebleed  · Extreme drowsiness, confusion, or fainting  · Dizziness or dizziness with spinning sensation (vertigo)  · Weakness in an arm or leg or on one side of the face  · Difficulty speaking or seeing   Date Last Reviewed: 11/25/2014  © 0008-3182 [employer]. [address] [place] [address], [place] [address]. All rights reserved. This information is not intended as a substitute for professional medical care. Always follow your healthcare professional's instructions.          Controlling High Blood Pressure  High blood pressure (hypertension) is often called the silent killer. This is because many people who have it dont know it. High  blood pressure is defined as 140/90 mm Hg or higher. Know your blood pressure and remember to check it regularly. Doing so can save your life. Here are some things you can do to help control your blood pressure.    Choose heart-healthy foods  · Select low-salt, low-fat foods. Limit sodium intake to 2,400 mg per day or the amount suggested by your healthcare provider.  · Limit canned, dried, cured, packaged, and fast foods. These can contain a lot of salt.  · Eat 8 to 10 servings of fruits and vegetables every day.  · Choose lean meats, fish, or chicken.  · Eat whole-grain pasta, brown rice, and beans.  · Eat 2 to 3 servings of low-fat or fat-free dairy products.  · Ask your doctor about the DASH eating plan. This plan helps reduce blood pressure.  · When you go to a restaurant, ask that your meal be prepared with no added salt.  Maintain a healthy weight  · Ask your healthcare provider how many calories to eat a day. Then stick to that number.  · Ask your healthcare provider what weight range is healthiest for you. If you are overweight, a weight loss of only 3% to 5% of your body weight can help lower blood pressure. Generally, a good weight loss goal is to lose 10% of your body weight in a year.  · Limit snacks and sweets.  · Get regular exercise.  Get up and get active  · Choose activities you enjoy. Find ones you can do with friends or family. This includes bicycling, dancing, walking, and jogging.  · Park farther away from building entrances.  · Use stairs instead of the elevator.  · When you can, walk or bike instead of driving.  · [place] leaves, garden, or do household repairs.  · Be active at a moderate to vigorous level of physical activity for at least 40 minutes for a minimum of 3 to 4 days a week.   Manage stress  · Make time to relax and enjoy life. Find time to laugh.  · Communicate your concerns with your loved ones and your healthcare provider.  · Visit with family and friends, and keep up with  hobbies.  Limit alcohol and quit smoking  · Men should have no more than 2 drinks per day.  · Women should have no more than 1 drink per day.  · Talk with your healthcare provider about quitting smoking. Smoking significantly increases your risk for heart disease and stroke. Ask your healthcare provider about community smoking cessation programs and other options.  Medicines  If lifestyle changes arent enough, your healthcare provider may prescribe high blood pressure medicine. Take all medicines as prescribed. If you have any questions about your medicines, ask your healthcare provider before stopping or changing them.   Date Last Reviewed: 4/27/2016  © 9911-4861 [employer]. [address] [place] [address]. All rights reserved. This information is not intended as a substitute for professional medical care. Always follow your healthcare professional's instructions.          How Your Hip Works  The hip joint is one of the bodys largest weight-bearing joints. Its a ball-and-socket joint. This helps the hip remain stable even during twisting and extreme ranges of motion. A healthy hip joint allows you to walk, squat, and turn without pain.     Side view of the right hip   A healthy hip  The hip joint is formed where the rounded head of the thighbone (femur) joins the pelvic bone. The joint is covered with tissue and powered by large muscles. When all of the parts listed below are healthy, a hip should move easily.     Front view of the right hip   · Cartilage is a layer of smooth tissue. It covers the ball of the thighbone, and lines the socket of the pelvic bone. Healthy cartilage absorbs stress and allows the ball to glide easily in the socket.  · Muscles power the hip and leg for movement.  · Tendons attach the muscles to the bones.  Date Last Reviewed: 8/28/2015 © 2000-2016 [employer]. [address] [place] [address]. All rights reserved. This information is not  intended as a substitute for professional medical care. Always follow your healthcare professional's instructions.

## 2018-04-19 ENCOUNTER — HOSPITAL ENCOUNTER (EMERGENCY)
Facility: HOSPITAL | Age: 48
Discharge: HOME | End: 2018-04-19
Attending: EMERGENCY MEDICINE
Payer: MEDICAID

## 2018-04-19 VITALS
HEART RATE: 79 BPM | SYSTOLIC BLOOD PRESSURE: 184 MMHG | BODY MASS INDEX: 29.57 KG/M2 | DIASTOLIC BLOOD PRESSURE: 90 MMHG | HEIGHT: 66 IN | TEMPERATURE: 98 F | RESPIRATION RATE: 16 BRPM | WEIGHT: 184 LBS | OXYGEN SATURATION: 99 %

## 2018-04-19 DIAGNOSIS — S63.501A WRIST SPRAIN, RIGHT, INITIAL ENCOUNTER: Primary | ICD-10-CM

## 2018-04-19 DIAGNOSIS — M25.531 ACUTE WRIST PAIN, RIGHT: ICD-10-CM

## 2018-04-19 PROCEDURE — 29125 APPL SHORT ARM SPLINT STATIC: CPT | Mod: RT

## 2018-04-19 PROCEDURE — 99283 EMERGENCY DEPT VISIT LOW MDM: CPT | Mod: 25

## 2018-04-19 RX ORDER — NAPROXEN 500 MG/1
500 TABLET ORAL 2 TIMES DAILY WITH MEALS
Qty: 20 TABLET | Refills: 0 | Status: SHIPPED | OUTPATIENT
Start: 2018-04-19

## 2018-04-19 RX ORDER — TRAMADOL HYDROCHLORIDE 50 MG/1
50 TABLET ORAL EVERY 6 HOURS PRN
Qty: 12 TABLET | Refills: 0 | Status: SHIPPED | OUTPATIENT
Start: 2018-04-19

## 2018-04-19 NOTE — ED PROVIDER NOTES
SCRIBE #1 NOTE: I, Corinne Mack, am scribing for, and in the presence of, Alirio William MD. I have scribed the entire note.      History      Chief Complaint   Patient presents with    Wrist Pain     R wrist pain onset 3 days ago after she fell on it.  No swelling. No deformity.       Review of patient's allergies indicates:  No Known Allergies     HPI   HPI    4/19/2018, 2:27 PM   History obtained from the patient      History of Present Illness: Yas Quinones is a 47 y.o. female patient with PMHx of anxiety and HTN who presents to the Emergency Department for R wrist pain which onset suddenly 3 days ago after the pt tripped and fell on it. Symptoms are constant and mild in severity. No mitigating or exacerbating factors reported. No associated sxs reported. Patient denies any N/V, head injury, back pain, neck pain, HA, LOC, dizziness, and all other sxs at this time. No prior Tx reported. No further complaints or concerns at this time.         Arrival mode: Personal vehicle    PCP: Provider [facility]       Past Medical History:  Past Medical History:   Diagnosis Date    Anxiety     Depression     Hypertension        Past Surgical History:  Past Surgical History:   Procedure Laterality Date    COLONOSCOPY N/A 12/7/2015    Procedure: COLONOSCOPY;  Surgeon: Radha Covington MD;  Location: [facility];  Service: Endoscopy;  Laterality: N/A;    TUBAL LIGATION           Family History:  Family History   Problem Relation Age of Onset    Colon cancer Sister 48       Social History:  Social History     Social History Main Topics    Smoking status: Never Smoker    Smokeless tobacco: Never Used    Alcohol use No    Drug use: No    Sexual activity: Not Currently       ROS   Review of Systems   Constitutional: Negative for chills and fever.   HENT: Negative for facial swelling.         (-) head injury   Respiratory: Negative for cough and shortness of breath.    Cardiovascular: Negative for chest pain and leg  swelling.   Gastrointestinal: Negative for abdominal pain, diarrhea, nausea and vomiting.   Musculoskeletal: Positive for arthralgias (R wrist). Negative for back pain, neck pain and neck stiffness.        (+) fall   Skin: Negative for rash and wound.   Neurological: Negative for dizziness, light-headedness, numbness and headaches.        (-) LOC   All other systems reviewed and are negative.    Physical Exam      Initial Vitals [04/19/18 1358]   BP Pulse Resp Temp SpO2   (!) 201/99 88 20 98 °F (36.7 °C) 98 %      MAP       133          Physical Exam  Nursing Notes and Vital Signs Reviewed.  Constitutional: Patient is in no apparent distress. Well-developed and well-nourished.  Head: Atraumatic. Normocephalic.  Eyes: PERRL. EOM intact. Conjunctivae are not pale. No scleral icterus.  ENT: Mucous membranes are moist. Oropharynx is clear and symmetric.    Neck: Supple. Full ROM. No lymphadenopathy.  Cardiovascular: Regular rate. Regular rhythm. No murmurs, rubs, or gallops. Distal pulses are 2+ and symmetric.  Pulmonary/Chest: No respiratory distress. Clear to auscultation bilaterally. No wheezing or rales.  Abdominal: Soft and non-distended.  There is no tenderness.  No rebound, guarding, or rigidity.   Musculoskeletal: Moves all extremities. No obvious deformities. No edema. No calf tenderness. Tenderness over the base of the R thumb, over the scaphoid, and over the distal lateral radius with mild swelling and no bony point tenderness to palpation. Neurovascularly intact.  Skin: Warm and dry.  Neurological:  Alert, awake, and appropriate.  Normal speech.  No acute focal neurological deficits are appreciated.  Psychiatric: Normal affect. Good eye contact. Appropriate in content.    ED Course    Splint Application  Date/Time: 4/19/2018 2:31 PM  Performed by: IVANNA JACOME.  Authorized by: IVANNA JACOME   Consent Done: Yes  Consent: Verbal consent obtained.  Risks and benefits: risks, benefits and alternatives were  "discussed  Consent given by: patient  Patient understanding: patient states understanding of the procedure being performed  Patient consent: the patient's understanding of the procedure matches consent given  Patient identity confirmed: , name and verbally with patient  Location details: right wrist  Splint type: thumb spica  Post-procedure: The splinted body part was neurovascularly unchanged following the procedure.  Patient tolerance: Patient tolerated the procedure well with no immediate complications        ED Vital Signs:  Vitals:    18 1358 18 1503   BP: (!) 201/99 (!) 184/90   Pulse: 88 79   Resp: 20 16   Temp: 98 °F (36.7 °C)    TempSrc: Oral    SpO2: 98% 99%   Weight: 83.5 kg (184 lb)    Height: 5' 6" (1.676 m)        Abnormal Lab Results:  Labs Reviewed - No data to display     All Lab Results:  None    Imaging Results:  Imaging Results          X-Ray Wrist Complete Right (Final result)  Result time 18 14:27:40    Final result by Mookie Mahoney MD (18 14:27:40)                 Impression:     Normal study      Electronically signed by: MOOKIE MAHONEY MD  Date:     18  Time:    14:27              Narrative:    XR WRIST COMPLETE 3 VIEWS RIGHT, 18 14:20:32    History:   M25.531 Pain in right wrist;    Bone density and architecture are normal. No acute findings.                                      The Emergency Provider reviewed the vital signs and test results, which are outlined above.    ED Discussion     2:30 PM: Reassessed pt at this time. Pt is awake, alert, and in no distress. Discussed with pt all pertinent ED information and results. Discussed pt dx and plan of tx. Gave pt all f/u and return to the ED instructions. All questions and concerns were addressed at this time. Pt expresses understanding of information and instructions, and is comfortable with plan to discharge. Pt is stable for discharge.    I discussed with patient and/or family/caretaker " that evaluation in the ED does not suggest any emergent or life threatening medical conditions requiring immediate intervention beyond what was provided in the ED, and I believe patient is safe for discharge.  Regardless, an unremarkable evaluation in the ED does not preclude the development or presence of a serious of life threatening condition. As such, patient was instructed to return immediately for any worsening or change in current symptoms.    I discussed with patient and/or family/caretaker that negative X-ray does not rule out occult fracture or other soft tissue injury.  Persistent pain greater than 7-10 days or increased pain requires follow up, specifically with orthopedics.       ED Medication(s):  Medications - No data to display    Discharge Medication List as of 4/19/2018  2:34 PM      START taking these medications    Details   naproxen (NAPROSYN) 500 MG tablet Take 1 tablet (500 mg total) by mouth 2 (two) times daily with meals., Starting u 4/19/2018, Print      traMADol (ULTRAM) 50 mg tablet Take 1 tablet (50 mg total) by mouth every 6 (six) hours as needed for Pain., Starting Thu 4/19/2018, Print             Follow-up Information     Schedule an appointment as soon as possible for a visit  with Provider [facility].    Why:  Follow up with your primary doctor in the next 2-3 days for a re-check and possible referral to orthopedics for furtherv evaluation if your symptoms persist..  Return to the emergency department for any worsening signs or symptoms.                   Medical Decision Making    Medical Decision Making:   Clinical Tests:   Radiological Study: Reviewed and Ordered           Scribe Attestation:   Scribe #1: I performed the above scribed service and the documentation accurately describes the services I performed. I attest to the accuracy of the note.    Attending:   Physician Attestation Statement for Scribe #1: I, Alirio William MD, personally performed the services described in  this documentation, as scribed by Corinne Mack, in my presence, and it is both accurate and complete.          Clinical Impression       ICD-10-CM ICD-9-CM   1. Wrist sprain, right, initial encounter S63.501A 842.00   2. Acute wrist pain, right M25.531 719.43       Disposition:   Disposition: Discharged  Condition: Stable           Alirio William MD  04/20/18 5380

## 2018-12-27 ENCOUNTER — HOSPITAL ENCOUNTER (EMERGENCY)
Facility: HOSPITAL | Age: 48
Discharge: HOME | End: 2018-12-27
Attending: EMERGENCY MEDICINE
Payer: MEDICAID

## 2018-12-27 VITALS
DIASTOLIC BLOOD PRESSURE: 76 MMHG | HEIGHT: 66 IN | WEIGHT: 241 LBS | RESPIRATION RATE: 20 BRPM | SYSTOLIC BLOOD PRESSURE: 168 MMHG | BODY MASS INDEX: 38.73 KG/M2 | OXYGEN SATURATION: 96 % | HEART RATE: 88 BPM | TEMPERATURE: 98 F

## 2018-12-27 DIAGNOSIS — R51.9 NONINTRACTABLE HEADACHE, UNSPECIFIED CHRONICITY PATTERN, UNSPECIFIED HEADACHE TYPE: ICD-10-CM

## 2018-12-27 DIAGNOSIS — J06.9 UPPER RESPIRATORY TRACT INFECTION, UNSPECIFIED TYPE: Primary | ICD-10-CM

## 2018-12-27 PROCEDURE — 63600175 PHARM REV CODE 636 W HCPCS: Performed by: NURSE PRACTITIONER

## 2018-12-27 PROCEDURE — 99284 EMERGENCY DEPT VISIT MOD MDM: CPT | Mod: 25

## 2018-12-27 PROCEDURE — 96372 THER/PROPH/DIAG INJ SC/IM: CPT

## 2018-12-27 RX ORDER — PROMETHAZINE HYDROCHLORIDE AND DEXTROMETHORPHAN HYDROBROMIDE 6.25; 15 MG/5ML; MG/5ML
5 SYRUP ORAL EVERY 6 HOURS PRN
Qty: 120 ML | Refills: 0 | Status: SHIPPED | OUTPATIENT
Start: 2018-12-27

## 2018-12-27 RX ORDER — FLUTICASONE PROPIONATE 50 MCG
2 SPRAY, SUSPENSION (ML) NASAL DAILY
Qty: 16 G | Refills: 0 | Status: SHIPPED | OUTPATIENT
Start: 2018-12-27 | End: 2019-01-10

## 2018-12-27 RX ORDER — KETOROLAC TROMETHAMINE 30 MG/ML
30 INJECTION, SOLUTION INTRAMUSCULAR; INTRAVENOUS ONCE
Status: COMPLETED | OUTPATIENT
Start: 2018-12-27 | End: 2018-12-27

## 2018-12-27 RX ADMIN — KETOROLAC TROMETHAMINE 30 MG: 30 INJECTION INTRAMUSCULAR; INTRAVENOUS at 09:12

## 2018-12-27 NOTE — ED NOTES
Bed: Int 25  Expected date:   Expected time:   Means of arrival:   Comments:     Corinne Mack  12/27/18 0829

## 2018-12-27 NOTE — ED NOTES
Patient identifiers verified and correct for Yas Quinones.    LOC: The patient is awake, alert and aware of environment with an appropriate affect, the patient is oriented x 3 and speaking appropriately, headache  APPEARANCE: Patient resting comfortably and in no acute distress, patient is clean and well groomed, patient's clothing is properly fastened.  SKIN: The skin is warm and dry, color consistent with ethnicity, patient has normal skin turgor and moist mucus membranes, skin intact, no breakdown or bruising noted.  MUSCULOSKELETAL: Patient moving all extremities spontaneously.  RESPIRATORY: Airway is open and patent, respirations are spontaneous, cough/congestion   CARDIAC: Patient has a normal rate, no periphreal edema noted, capillary refill < 3 seconds, elevated BP  ABDOMEN: Soft and non tender to palpation.

## 2019-01-24 RX ORDER — FLUTICASONE PROPIONATE 50 MCG
SPRAY, SUSPENSION (ML) NASAL
Qty: 16 ML | Refills: 0 | Status: SHIPPED | OUTPATIENT
Start: 2019-01-24

## 2019-04-27 ENCOUNTER — HOSPITAL ENCOUNTER (EMERGENCY)
Facility: HOSPITAL | Age: 49
Discharge: HOME | End: 2019-04-27
Attending: EMERGENCY MEDICINE
Payer: MEDICAID

## 2019-04-27 VITALS
HEART RATE: 101 BPM | OXYGEN SATURATION: 97 % | RESPIRATION RATE: 24 BRPM | HEIGHT: 66 IN | DIASTOLIC BLOOD PRESSURE: 76 MMHG | WEIGHT: 245.13 LBS | TEMPERATURE: 98 F | BODY MASS INDEX: 39.4 KG/M2 | SYSTOLIC BLOOD PRESSURE: 158 MMHG

## 2019-04-27 DIAGNOSIS — R00.0 TACHYCARDIA: ICD-10-CM

## 2019-04-27 DIAGNOSIS — J02.9 PHARYNGITIS, UNSPECIFIED ETIOLOGY: Primary | ICD-10-CM

## 2019-04-27 DIAGNOSIS — R05.9 COUGH: ICD-10-CM

## 2019-04-27 LAB
ANION GAP SERPL CALC-SCNC: 11 MMOL/L (ref 8–16)
BASOPHILS # BLD AUTO: 0.01 K/UL (ref 0–0.2)
BASOPHILS NFR BLD: 0.1 % (ref 0–1.9)
BUN SERPL-MCNC: 9 MG/DL (ref 6–20)
CALCIUM SERPL-MCNC: 9.5 MG/DL (ref 8.7–10.5)
CHLORIDE SERPL-SCNC: 98 MMOL/L (ref 95–110)
CO2 SERPL-SCNC: 30 MMOL/L (ref 23–29)
CREAT SERPL-MCNC: 0.8 MG/DL (ref 0.5–1.4)
DIFFERENTIAL METHOD: ABNORMAL
EOSINOPHIL # BLD AUTO: 0.1 K/UL (ref 0–0.5)
EOSINOPHIL NFR BLD: 0.8 % (ref 0–8)
ERYTHROCYTE [DISTWIDTH] IN BLOOD BY AUTOMATED COUNT: 15.1 % (ref 11.5–14.5)
EST. GFR  (AFRICAN AMERICAN): >60 ML/MIN/1.73 M^2
EST. GFR  (NON AFRICAN AMERICAN): >60 ML/MIN/1.73 M^2
GLUCOSE SERPL-MCNC: 104 MG/DL (ref 70–110)
HCT VFR BLD AUTO: 39.3 % (ref 37–48.5)
HGB BLD-MCNC: 12.7 G/DL (ref 12–16)
LYMPHOCYTES # BLD AUTO: 1.6 K/UL (ref 1–4.8)
LYMPHOCYTES NFR BLD: 21.2 % (ref 18–48)
MCH RBC QN AUTO: 26.5 PG (ref 27–31)
MCHC RBC AUTO-ENTMCNC: 32.3 G/DL (ref 32–36)
MCV RBC AUTO: 82 FL (ref 82–98)
MONOCYTES # BLD AUTO: 0.3 K/UL (ref 0.3–1)
MONOCYTES NFR BLD: 4.3 % (ref 4–15)
NEUTROPHILS # BLD AUTO: 5.5 K/UL (ref 1.8–7.7)
NEUTROPHILS NFR BLD: 73.6 % (ref 38–73)
PLATELET # BLD AUTO: 281 K/UL (ref 150–350)
PMV BLD AUTO: 10.4 FL (ref 9.2–12.9)
POTASSIUM SERPL-SCNC: 3.5 MMOL/L (ref 3.5–5.1)
RBC # BLD AUTO: 4.8 M/UL (ref 4–5.4)
SODIUM SERPL-SCNC: 139 MMOL/L (ref 136–145)
TROPONIN I SERPL DL<=0.01 NG/ML-MCNC: <0.006 NG/ML (ref 0–0.03)
WBC # BLD AUTO: 7.47 K/UL (ref 3.9–12.7)

## 2019-04-27 PROCEDURE — 96375 TX/PRO/DX INJ NEW DRUG ADDON: CPT

## 2019-04-27 PROCEDURE — 93005 ELECTROCARDIOGRAM TRACING: CPT

## 2019-04-27 PROCEDURE — 93010 ELECTROCARDIOGRAM REPORT: CPT | Mod: ,,, | Performed by: INTERNAL MEDICINE

## 2019-04-27 PROCEDURE — 93010 EKG 12-LEAD: ICD-10-PCS | Mod: ,,, | Performed by: INTERNAL MEDICINE

## 2019-04-27 PROCEDURE — 96374 THER/PROPH/DIAG INJ IV PUSH: CPT

## 2019-04-27 PROCEDURE — 84484 ASSAY OF TROPONIN QUANT: CPT

## 2019-04-27 PROCEDURE — 63600175 PHARM REV CODE 636 W HCPCS: Performed by: PHYSICIAN ASSISTANT

## 2019-04-27 PROCEDURE — 99285 EMERGENCY DEPT VISIT HI MDM: CPT | Mod: 25

## 2019-04-27 PROCEDURE — 80048 BASIC METABOLIC PNL TOTAL CA: CPT

## 2019-04-27 PROCEDURE — 85025 COMPLETE CBC W/AUTO DIFF WBC: CPT

## 2019-04-27 PROCEDURE — 25000003 PHARM REV CODE 250: Performed by: PHYSICIAN ASSISTANT

## 2019-04-27 RX ORDER — METOPROLOL TARTRATE 1 MG/ML
5 INJECTION, SOLUTION INTRAVENOUS
Status: COMPLETED | OUTPATIENT
Start: 2019-04-27 | End: 2019-04-27

## 2019-04-27 RX ORDER — PROMETHAZINE HYDROCHLORIDE AND DEXTROMETHORPHAN HYDROBROMIDE 6.25; 15 MG/5ML; MG/5ML
5 SYRUP ORAL 4 TIMES DAILY PRN
Qty: 240 ML | Refills: 0 | Status: SHIPPED | OUTPATIENT
Start: 2019-04-27 | End: 2019-05-07

## 2019-04-27 RX ORDER — METHYLPREDNISOLONE 4 MG/1
TABLET ORAL
Qty: 1 PACKAGE | Refills: 0 | Status: SHIPPED | OUTPATIENT
Start: 2019-04-27 | End: 2019-05-18

## 2019-04-27 RX ORDER — HYDRALAZINE HYDROCHLORIDE 20 MG/ML
10 INJECTION INTRAMUSCULAR; INTRAVENOUS
Status: COMPLETED | OUTPATIENT
Start: 2019-04-27 | End: 2019-04-27

## 2019-04-27 RX ORDER — AMOXICILLIN AND CLAVULANATE POTASSIUM 875; 125 MG/1; MG/1
1 TABLET, FILM COATED ORAL 2 TIMES DAILY
Qty: 20 TABLET | Refills: 0 | Status: SHIPPED | OUTPATIENT
Start: 2019-04-27 | End: 2019-05-07

## 2019-04-27 RX ADMIN — HYDRALAZINE HYDROCHLORIDE 10 MG: 20 INJECTION INTRAMUSCULAR; INTRAVENOUS at 09:04

## 2019-04-27 RX ADMIN — SODIUM CHLORIDE 1000 ML: 0.9 INJECTION, SOLUTION INTRAVENOUS at 09:04

## 2019-04-27 RX ADMIN — METOPROLOL TARTRATE 5 MG: 5 INJECTION, SOLUTION INTRAVENOUS at 09:04

## 2019-04-27 NOTE — ED PROVIDER NOTES
Encounter Date: 4/27/2019       History     Chief Complaint   Patient presents with    Sore Throat     sore throat, congestion and HA x 3 days. Denies fever      The history is provided by the patient.   Sore Throat   This is a new problem. The current episode started more than 2 days ago. The problem occurs constantly. Pertinent negatives include no chest pain, no abdominal pain, no headaches and no shortness of breath. The symptoms are aggravated by swallowing. Nothing relieves the symptoms.     Review of patient's allergies indicates:  No Known Allergies  Past Medical History:   Diagnosis Date    Anxiety     Depression     Hypertension      Past Surgical History:   Procedure Laterality Date    COLONOSCOPY N/A 12/7/2015    [facility]    TUBAL LIGATION       Family History   Problem Relation Age of Onset    Colon cancer Sister 48     Social History     Tobacco Use    Smoking status: Never Smoker    Smokeless tobacco: Never Used   Substance Use Topics    Alcohol use: No     Alcohol/week: 0.0 oz    Drug use: No     Review of Systems   Constitutional: Negative for chills and fever.   HENT: Positive for sore throat.    Eyes: Negative for photophobia and redness.   Respiratory: Negative for cough and shortness of breath.    Cardiovascular: Negative for chest pain.   Gastrointestinal: Negative for abdominal pain, diarrhea and nausea.   Endocrine: Negative for polydipsia and polyphagia.   Genitourinary: Negative for dysuria.   Musculoskeletal: Negative for arthralgias, back pain and myalgias.   Skin: Negative for rash.   Neurological: Negative for weakness and headaches.   Hematological: Does not bruise/bleed easily.   Psychiatric/Behavioral: The patient is not nervous/anxious.    All other systems reviewed and are negative.      Physical Exam     Initial Vitals [04/27/19 0802]   BP Pulse Resp Temp SpO2   (!) 226/132 (!) 132 18 98.1 °F (36.7 °C) (!) 93 %      MAP       --          Physical Exam    Nursing note and vitals reviewed.  Constitutional: Vital signs are normal. She appears well-developed and well-nourished. No distress.   HENT:   Head: Normocephalic and atraumatic.   Right Ear: External ear normal.   Left Ear: External ear normal.   Nose: Nose normal.   Mouth/Throat: Posterior oropharyngeal erythema present. No oropharyngeal exudate, posterior oropharyngeal edema or tonsillar abscesses.   Eyes: Conjunctivae, EOM and lids are normal. Pupils are equal, round, and reactive to light.   Neck: Normal range of motion and full passive range of motion without pain. Neck supple.   Cardiovascular: Normal rate, regular rhythm, S1 normal, S2 normal, normal heart sounds, intact distal pulses and normal pulses.   Pulmonary/Chest: Breath sounds normal. No respiratory distress. She has no wheezes. She has no rales.   Abdominal: Soft. Normal appearance and bowel sounds are normal. She exhibits no distension. There is no tenderness.   Musculoskeletal: Normal range of motion.   Lymphadenopathy:     She has no cervical adenopathy.   Neurological: She is alert and oriented to person, place, and time. She has normal strength. No cranial nerve deficit or sensory deficit. Coordination and gait normal.   Skin: Skin is warm, dry and intact.   Psychiatric: She has a normal mood and affect. Her speech is normal and behavior is normal. Judgment and thought content normal. Cognition and memory are normal.         ED Course   Procedures  Labs Reviewed   CBC W/ AUTO DIFFERENTIAL - Abnormal; Notable for the following components:       Result Value    MCH 26.5 (*)     RDW 15.1 (*)     Gran% 73.6 (*)     All other components within normal limits   BASIC METABOLIC PANEL - Abnormal; Notable for the following components:    CO2 30 (*)     All other components within normal limits   TROPONIN I          Imaging Results          X-Ray Chest PA And Lateral (Final result)  Result time 04/27/19 09:18:53    Final result by  Javi Cuevas MD (04/27/19 09:18:53)                 Impression:      No acute process seen.      Electronically signed by: Javi Cuevas MD  Date:    04/27/2019  Time:    09:18             Narrative:    EXAMINATION:  XR CHEST PA AND LATERAL    CLINICAL HISTORY:  Cough    COMPARISON:  None    FINDINGS:  Cardiac silhouette is normal. Aorta demonstrates atherosclerotic disease. Tortuosity of the descending aorta can be seen with chronic hypertension.  The lungs demonstrate no evidence of active disease.  No evidence of pleural effusion or pneumothorax.  Bones appear intact.                                                      Clinical Impression:       ICD-10-CM ICD-9-CM   1. Pharyngitis, unspecified etiology J02.9 462   2. Cough R05 786.2   3. Tachycardia R00.0 785.0         Disposition:   Disposition: Discharged  Condition: Stable                        DM Gonzales  04/27/19 1020       DM Gonzales  05/22/19 0819

## 2021-04-28 ENCOUNTER — PATIENT MESSAGE (OUTPATIENT)
Dept: RESEARCH | Facility: HOSPITAL | Age: 51
End: 2021-04-28